# Patient Record
Sex: FEMALE | Race: WHITE | Employment: FULL TIME | URBAN - METROPOLITAN AREA
[De-identification: names, ages, dates, MRNs, and addresses within clinical notes are randomized per-mention and may not be internally consistent; named-entity substitution may affect disease eponyms.]

---

## 2023-02-16 ENCOUNTER — APPOINTMENT (OUTPATIENT)
Dept: PHYSICAL THERAPY | Age: 23
End: 2023-02-16

## 2023-02-16 ENCOUNTER — HOSPITAL ENCOUNTER (OUTPATIENT)
Facility: HOSPITAL | Age: 23
Setting detail: RECURRING SERIES
Discharge: HOME | End: 2023-02-19
Payer: OTHER GOVERNMENT

## 2023-02-16 PROCEDURE — 97535 SELF CARE MNGMENT TRAINING: CPT

## 2023-02-16 PROCEDURE — 97162 PT EVAL MOD COMPLEX 30 MIN: CPT

## 2023-02-16 NOTE — PROGRESS NOTES
PHYSICAL / OCCUPATIONAL THERAPY - DAILY TREATMENT NOTE (updated )    Patient Name: Shasta Prescott    Date: 2023    : 2000  Insurance: Payor: [email or web address] EAST / Plan: [email or web address] EAST  / Product Type: *No Product type* /      Patient  verified Yes     Visit #   Current / Total 1 8   Time   In / Out 8:15 9:20   Pain   In / Out 0 0   Subjective Functional Status/Changes: See POC   Changes to:  Meds, Allergies, Med Hx, Sx Hx? If yes, update Summary List no       TREATMENT AREA =  Stress incontinence (female) (male) (N39.3)  Low back pain  OBJECTIVE    Eval untimed: 40 min    Therapeutic Procedures: Tx Min Billable or 1:1 Min (if diff from Tx Min) Procedure, Rationale, Specifics   25  59110 Self Care/Home Management (timed):  improve patient knowledge and understanding of diagnosis/prognosis, physical therapy expectations, procedures and progression, and pelvic floor muscle anatomy, function and dysfunction  to improve patient's ability to progress to PLOF and address remaining functional goals.   (see flow sheet as applicable)     Details if applicable:              Details if applicable:            Details if applicable:            Details if applicable:            Details if applicable:     22  [facility] Totals Reminder: bill using total billable min of TIMED therapeutic procedures (example: do not include dry needle or estim unattended, both untimed codes, in totals to left)  8-22 min = 1 unit; 23-37 min = 2 units; 38-52 min = 3 units; 53-67 min = 4 units; 68-82 min = 5 units   Total Total     [x]  Patient Education billed concurrently with other procedures   [x] Review HEP    [] Progressed/Changed HEP, detail:    [] Other detail:       Objective Information/Functional Measures/Assessment    See POC    Patient will continue to benefit from skilled PT / OT services to modify and progress therapeutic interventions, analyze and address strength deficits, instruct in home and community integration, and address KI and LBP  to address functional deficits and attain remaining goals.     Progress toward goals / Updated goals:  []  See Progress Note/Recertification    See POC    PLAN  Yes  Continue plan of care  []  Upgrade activities as tolerated  []  Discharge due to :  []  Other:    Hunter Rosas, HEMAL    2/16/2023    9:20 AM    Future Appointments   Date Time Provider Malika Vera   3/9/2023 12:00 PM Hunter Rosas, PT [facility]   3/16/2023  2:00 PM Hunter Rosas, PT [facility]   3/23/2023  2:00 PM Hunter Rosas, PT [facility]   3/27/2023  2:00 PM Hunter Rosas, PT [facility]   4/3/2023  2:00 PM Hunter Rosas, PT [facility]   4/10/2023  2:00 PM Hunter Rosas, PT [facility]   4/17/2023  2:00 PM Hunter Rosas, PT [facility]

## 2023-02-16 NOTE — THERAPY EVALUATION
[facility] PHYSICAL THERAPY  [address] [place] [address], [address] [place] [address], [address] [place] [address] KQ:[phone] Fx: [phone]  Plan of Care / Statement of Necessity for Physical Therapy Services     Patient Name: Suzy Buerger : 2000   Medical   Diagnosis: Low back pain unspecified ()  Unspecified urinary incontinence (R32) Treatment Diagnosis: Stress incontinence [N39.3]  Other low back pain [M54.59]   Onset Date: 2021     Referral Source: Jaylan Massey* Start of Care [facility]): 2023   Prior Hospitalization: See medical history Provider #: [id number]   Prior Level of Function: Symptom free with ADLs   Comorbidities: , Chronic LBP   Medications: Verified on Patient Summary List     Assessment / key information:  Patient  with vaginal delivery complicated by shoulder dystocia 2021 who reports KI with extended standing > 1 hour, sexual activity and working out. She has nocturia 1x nightly. She denies urinary urgency or dyspareunia but does report decreased sexual appreciation. She has chronic LBP with xray and MRI showing DDD of lumbar spine. LBP began following a fall down a ladder while on the job in . 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 Patient presents to PT with impaired strength of pelvic floor muscles scoring 2-/6/3/8 on PERF. On biofeedback there was low net rise of slow twitch contractions at 6.08 micro volts and low net rise of fast twitch contractions at 9.28 micro volts. She scored 60/100 on FOTO Urinary Problem indicating decreased quality of life.     Evaluation Complexity:  History:  MEDIUM  Complexity : 1-2 comorbidities / personal factors will impact the outcome/ POC ; Examination:  HIGH Complexity : 4+ Standardized tests and measures addressing body structure, function, activity limitation and / or participation in recreation  ;Presentation:  MEDIUM Complexity : Evolving with changing characteristics  ; Clinical Decision Making:  MEDIUM Complexity : FOTO score of 26-74 FOTO score = an established functional score where 100 = no disability  Overall Complexity Rating: MEDIUM  Problem List: pain affecting function, decrease strength, decrease ADL/functional abilities, decrease activity tolerance, and other KI    Treatment Plan may include any combination of the followin Therapeutic Exercise, 70913 Neuromuscular Re-Education, 30574 Manual Therapy, 11902 Therapeutic Activity, 63485 Self Care/Home Management, and 97044 Electrical Stim unattended  Patient / Family readiness to learn indicated by: asking questions, trying to perform skills, and interest  Persons(s) to be included in education: patient (P)  Barriers to Learning/Limitations: none  Measures taken if barriers to learning present:   Patient Goal (s): Decrease leakage with sexual activity and working out. Decrease LBP. Patient Self Reported Health Status: good  Rehabilitation Potential: good    Short Term Goals: To be accomplished in 4 weeks    Patient performing pelvic floor HEP 3x day. 2.   Patient will report 25% improvement in urinary leaking with activity. 3.   Patient will increase fast twitch contraction by 5 micro volts from initial evaluation to increase urinary continence. 4.   Patient will score +2 on Global rating of change to indicate LBP to be a little better. Long Term Goals: To be accomplished in 4 weeks  Patient  independent with pelvic floor HEP. 2.   Patient will report 50% improvement in urinary leaking with activity. 3.   Patient will increase fast twitch contraction by 10 microvolts from initial evaluation to increase urinary continence. 4.   Patient will score +4 on Global rating of change to indicate LBP with ADLs to be moderately better.   Frequency / Duration: Patient to be seen 1 times per week for 8 weeks    Patient/ Caregiver education and instruction: Diagnosis, prognosis, self care, activity modification, exercises, and other bladder fitness  [x]  Plan of care has been reviewed with PTA    Certification Period:     Wilmar Cuortney, PT       2/16/2023       12:26 PM  ===================================================================  I certify that the above Therapy Services are being furnished while the patient is under my care. I agree with the treatment plan and certify that this therapy is necessary. [de-identified] Signature:_________________________   DATE:_________   TIME:________                           Ju Feil*    ** Signature, Date and Time must be completed for valid certification **  Please sign and fax to Josh Castillo [phone].   Thank you

## 2023-03-09 ENCOUNTER — HOSPITAL ENCOUNTER (OUTPATIENT)
Facility: HOSPITAL | Age: 23
Setting detail: RECURRING SERIES
Discharge: HOME | End: 2023-03-12
Payer: OTHER GOVERNMENT

## 2023-03-09 PROCEDURE — 97112 NEUROMUSCULAR REEDUCATION: CPT

## 2023-03-09 PROCEDURE — 97140 MANUAL THERAPY 1/> REGIONS: CPT

## 2023-03-09 NOTE — PROGRESS NOTES
PHYSICAL / OCCUPATIONAL THERAPY - DAILY TREATMENT NOTE (updated )    Patient Name: Jennifer Muhammad    Date: 3/9/2023    : 2000  Insurance: Payor: [employer] EAST / Plan: [employer] EAST / Product Type: *No Product type* /      Patient  verified Yes     Visit #   Current / Total 2 8   Time   In / Out 11:50 12:36   Pain   In / Out 0 0   Subjective Functional Status/Changes: Patient reports going to ER for a migraine which had lasted 6 days. No HA now. Doing HEP 3x day prior to week of migraine. Changes to:  Meds, Allergies, Med Hx, Sx Hx? If yes, update Summary List yes       TREATMENT AREA =  Stress incontinence [N39.3]  Other low back pain [M54.59]  OBJECTIVE    Therapeutic Procedures: Tx Min Billable or 1:1 Min (if diff from Tx Min) Procedure, Rationale, Specifics         Details if applicable:       38  93628 Neuromuscular Re-Education (timed):  improve balance, coordination, kinesthetic sense, posture, core stability and proprioception to improve patient's ability to develop conscious control of individual muscles and awareness of position of extremities in order to progress to PLOF and address remaining functional goals. (see flow sheet as applicable)     Details if applicable:  Biofeedback to the pelvic floor muscles via surface electrodes   8  82704 Manual Therapy (timed):  increase strength to improve patient's ability to progress to PLOF and address remaining functional goals. The manual therapy interventions were performed at a separate and distinct time from the therapeutic activities interventions .  (see flow sheet as applicable)     Details if applicable:  Manual palpation of PF contraction with education in proper execution          Details if applicable:            Details if applicable:     55  [facility] Totals Reminder: bill using total billable min of TIMED therapeutic procedures (example: do not include dry needle or estim unattended, both untimed codes, in totals to left)  8- min = 1 unit; 23-37 min = 2 units; 38-52 min = 3 units; 53-67 min = 4 units; 68-82 min = 5 units   Total Total     [x]  Patient Education billed concurrently with other procedures   [x] Review HEP    [x] Progressed/Changed HEP, detail:  Pelvic floor HEP 3x day lying down knees bent with 3 second holds  [] Other detail:       Objective Information/Functional Measures/Assessment    Decreased strength of pelvic floor muscles on biofeedback which may be due to patient not doing HEP this week secondary to migraine. Patient will continue to benefit from skilled PT / OT services to modify and progress therapeutic interventions, analyze and address strength deficits, instruct in home and community integration, and address UI  to address functional deficits and attain remaining goals. Progress toward goals / Updated goals:  []  See Progress Note/Recertification    First follow up since initial evaluation.     PLAN  Yes  Continue plan of care  [x]  Upgrade activities as tolerated  []  Discharge due to :  []  Other:    Catalina Guerreros, PT    3/9/2023    12:36 PM    Future Appointments   Date Time Provider Malika Vera   3/9/2023 12:00 PM Catalina Miners, PT [facility]   3/16/2023  2:00 PM Catalina Miners, PT [facility]   3/23/2023  2:00 PM Catalina Miners, PT [facility]   3/27/2023  2:00 PM Catalina Miners, PT [facility]   4/3/2023  2:00 PM Catalina Miners, PT [facility]   4/10/2023  2:00 PM Catalina Miners, PT [facility]   4/17/2023  2:00 PM Catalina Miners, PT [facility]

## 2023-03-16 ENCOUNTER — HOSPITAL ENCOUNTER (OUTPATIENT)
Facility: HOSPITAL | Age: 23
Setting detail: RECURRING SERIES
Discharge: HOME | End: 2023-03-19
Payer: OTHER GOVERNMENT

## 2023-03-16 PROCEDURE — 97112 NEUROMUSCULAR REEDUCATION: CPT

## 2023-03-16 PROCEDURE — 97535 SELF CARE MNGMENT TRAINING: CPT

## 2023-03-16 NOTE — PROGRESS NOTES
PHYSICAL / OCCUPATIONAL THERAPY - DAILY TREATMENT NOTE (updated )    Patient Name: Agusto Nathan    Date: 3/16/2023    : 2000  Insurance: Payor: [employer] EAST / Plan: [employer] EAST / Product Type: *No Product type* /      Patient  verified Yes     Visit #   Current / Total 3 8   Time   In / Out 2:03 2:53   Pain   In / Out 0 0   Subjective Functional Status/Changes: See PN   Changes to:  Meds, Allergies, Med Hx, Sx Hx? If yes, update Summary List no       TREATMENT AREA =  Stress incontinence (female) (male) [N39.3]  Other low back pain [M54.59]    OBJECTIVE         Therapeutic Procedures: Tx Min Billable or 1:1 Min (if diff from Tx Min) Procedure, Rationale, Specifics   38  [id number] Neuromuscular Re-Education (timed):  improve balance, coordination, kinesthetic sense, posture, core stability and proprioception to improve patient's ability to develop conscious control of individual muscles and awareness of position of extremities in order to progress to PLOF and address remaining functional goals. (see flow sheet as applicable)     Details if applicable:   Pelvic floor biofeedback via surface electrodes. 12  64027 Self Care/Home Management (timed):  improve patient knowledge and understanding of physical therapy expectations, procedures and progression  to improve patient's ability to progress to PLOF and address remaining functional goals. (see flow sheet as applicable)     Details if applicable:  Educated patient in NMES to the pelvic floor indications including patient slow to strengthen the pelvic floor muscles and excessive use of accessory muscles. Patient declines at this time.           Details if applicable:            Details if applicable:            Details if applicable:     48  [address] Reminder: bill using total billable min of TIMED therapeutic procedures (example: do not include dry needle or estim unattended, both untimed codes, in totals to left)  8-22 min = 1 unit;

## 2023-03-16 NOTE — THERAPY RECERTIFICATION
[facility] THERAPY  [address],[facility], [facility] - Ph: [phone]  Fx: [phone]  PHYSICAL THERAPY PROGRESS NOTE  Patient Name: Moriah Mari : 2000   Treatment/Medical Diagnosis: Stress incontinence (female) (male) [N39.3]  Other low back pain [M54.59]   Referral Source: Esperanza Llanes*     Date of Initial Visit: 2023 Attended Visits: 3 Missed Visits: 0     SUMMARY OF TREATMENT  PT has consisted of pelvic floor relaxation/strengthening via biofeedback, education as to pelvic floor anatomy and function and home exercise program.     CURRENT STATUS  Patient has made slow progress in PT with short term goals partially met. Functional progress Includes patient reporting 60% improvement in urinary leaking. She is not leaking with working out and sexual activity. Continued functional impairments include leaking with swimming with butterfly and scissor kick. Also leaks when exercising on the adductor machine in the gym. Patient demonstrates improved but still impaired pelvic floor muscle strength. There is increased use of accessory muscle when joan the pelvic floor muscles. Patient performing pelvic floor exercises 3x day  Status at last Eval: n/a  Current Status: 3x day  Goal Met?  yes    2. Patient will report 25% subjective improvement in urinary leaking with activity. Status at last Eval: n/a  Current Status: 60%  Goal Met?  yes    3. Patient will Increase net rise of fast twitch contraction by 5 microvolts to increase continence. Status at last Eval: 9.28 micro volts  Current Status: 11.25  Goal Met?   progressing    4. Patient will score +2 on GROC indicating LBP a little better  Status at last Eval: n/a  Current Status: 0  Goal Met?  no      New Goals to be achieved in __4__ weeks  1. Patient independent with pelvic floor HEP.   2. Patient will report 75% improvement in urinary leaking with

## 2023-03-23 ENCOUNTER — HOSPITAL ENCOUNTER (OUTPATIENT)
Facility: HOSPITAL | Age: 23
Setting detail: RECURRING SERIES
Discharge: HOME | End: 2023-03-26
Payer: OTHER GOVERNMENT

## 2023-03-23 PROCEDURE — 97112 NEUROMUSCULAR REEDUCATION: CPT

## 2023-03-23 PROCEDURE — 97110 THERAPEUTIC EXERCISES: CPT

## 2023-03-23 PROCEDURE — 97535 SELF CARE MNGMENT TRAINING: CPT

## 2023-03-23 NOTE — PROGRESS NOTES
PHYSICAL / OCCUPATIONAL THERAPY - DAILY TREATMENT NOTE (updated )    Patient Name: Jim Okeefe    Date: 3/23/2023    : 2000  Insurance: Payor:  EAST / Plan: [employer] EAST / Product Type: *No Product type* /      Patient  verified Yes     Visit #   Current / Total 4 8   Time   In / Out 2:06 3:00   Pain   In / Out 2-3 2-3   Subjective Functional Status/Changes: Patient reports that she can feel the pelvic floor contractions better in sitting. HEP 3x day. Changes to:  Meds, Allergies, Med Hx, Sx Hx? If yes, update Summary List no       TREATMENT AREA =  Stress incontinence (female) (male) [N39.3]  Other low back pain [M54.59]    OBJECTIVE         Therapeutic Procedures: Tx Min Billable or 1:1 Min (if diff from Tx Min) Procedure, Rationale, Specifics   15  75052 Self Care/Home Management (timed):  improve patient knowledge and understanding of physical therapy expectations, procedures and progression and Body mechanics for bed mobility and lifting 3year old   to improve patient's ability to progress to PLOF and address remaining functional goals. (see flow sheet as applicable)     Details if applicable:  Education on DDD of spine, SI joint, modifying ADLs considering DDD of spine.  Neuromuscular Re-Education (timed):  improve balance, coordination, kinesthetic sense, posture, core stability and proprioception to improve patient's ability to develop conscious control of individual muscles and awareness of position of extremities in order to progress to PLOF and address remaining functional goals.  (see flow sheet as applicable)     Details if applicable:     10  97987 Neuromuscular Re-Education (timed):  improve balance, coordination, kinesthetic sense, posture, core stability and proprioception to improve patient's ability to develop conscious control of individual muscles and awareness of position of extremities in order to progress to PLOF and address remaining

## 2023-03-27 ENCOUNTER — HOSPITAL ENCOUNTER (OUTPATIENT)
Facility: HOSPITAL | Age: 23
Setting detail: RECURRING SERIES
Discharge: HOME | End: 2023-03-30
Payer: OTHER GOVERNMENT

## 2023-03-27 PROCEDURE — 97110 THERAPEUTIC EXERCISES: CPT

## 2023-03-27 PROCEDURE — 97112 NEUROMUSCULAR REEDUCATION: CPT

## 2023-03-27 PROCEDURE — 97535 SELF CARE MNGMENT TRAINING: CPT

## 2023-03-27 NOTE — PROGRESS NOTES
untimed codes, in totals to left)  8-22 min = 1 unit; 23-37 min = 2 units; 38-52 min = 3 units; 53-67 min = 4 units; 68-82 min = 5 units   Total Total     [x]  Patient Education billed concurrently with other procedures   [x] Review HEP    [x] Progressed/Changed HEP, detail:  Advance pelvic floor HEP to 3x day in seated position with 3 second slow twitch contractions. [x] Other detail:   Add SKTC and TA to core exercies 3x weekly    Objective Information/Functional Measures/Assessment    Patient demonstrates decreased accessory muscle use when performing pelvic floor exercises on biofeedback    Patient will continue to benefit from skilled PT / OT services to modify and progress therapeutic interventions, analyze and address functional mobility deficits, analyze and address ROM deficits, analyze and address strength deficits, analyze and cue for proper movement patterns, analyze and modify for postural abnormalities, instruct in home and community integration, and address UI  to address functional deficits and attain remaining goals. Progress toward goals / Updated goals:  []  See Progress Note/Recertification    Patient progressing towards LTG #1 for HEP performance as HEP has been progressively advanced.  PLAN  Yes  Continue plan of care  [x]  Upgrade activities as tolerated  []  Discharge due to :  []  Other:  Esthela Holland, PT    3/27/2023    3:03 PM    Future Appointments   Date Time Provider Malika Vera   3/27/2023  2:00 PM Esthela Holland, PT [facility]   4/3/2023  2:00 PM Esthela Holland, PT [facility]   4/10/2023  2:00 PM Esthela Carry, PT [facility]   4/17/2023  1:40 PM Esthela Carry, PT [facility]

## 2023-04-03 ENCOUNTER — HOSPITAL ENCOUNTER (OUTPATIENT)
Facility: HOSPITAL | Age: 23
Setting detail: RECURRING SERIES
Discharge: HOME | End: 2023-04-06
Payer: OTHER GOVERNMENT

## 2023-04-03 PROCEDURE — 97112 NEUROMUSCULAR REEDUCATION: CPT

## 2023-04-03 PROCEDURE — 97110 THERAPEUTIC EXERCISES: CPT

## 2023-04-03 NOTE — PROGRESS NOTES
PHYSICAL / OCCUPATIONAL THERAPY - DAILY TREATMENT NOTE (updated )    Patient Name: Ángel Mantilla    Date: 4/3/2023    : 2000  Insurance: Payor:  EAST / Plan: [employer] EAST / Product Type: *No Product type* /      Patient  verified Yes     Visit #   Current / Total 6 8   Time   In / Out 2:01 3:00   Pain   In / Out 4 4   Subjective Functional Status/Changes: Patient reports not doing [address]. She forgot secondary to doing 2 jobs. Changes to:  Meds, Allergies, Med Hx, Sx Hx? If yes, update Summary List no       TREATMENT AREA =  Stress incontinence (female) (male) [N39.3]  Other low back pain [M54.59]    OBJECTIVE    Modalities Rationale:     decrease pain to improve patient's ability to progress to PLOF and address remaining functional goals. min [] Estim Unattended, type/location:                                      []  w/ice    []  w/heat    min [] Estim Attended, type/location:                                     []  w/US     []  w/ice    []  w/heat    []  TENS insruct      min []  Mechanical Traction: type/lbs                   []  pro   []  sup   []  int   []  cont    []  before manual    []  after manual    min []  Ultrasound, settings/location:      min []  Iontophoresis w/ dexamethasone, location:                                               []  take home patch       []  in clinic   10 min  unbill []  Ice     [x]  Heat    location/position: L/S in delcid position    min []  Paraffin,  details:     min []  Vasopneumatic Device, press/temp:     min []  Dorothea Fowler / Tesfaye Aquino: If using vaso (only need to measure limb vaso being performed on)      pre-treatment girth :       post-treatment girth :       measured at (landmark location) :      min []  Other:    Skin assessment post-treatment (if applicable):    [x]  intact    []  redness- no adverse reaction                 []redness - adverse reaction:         Therapeutic Procedures:     Tx Min Billable or 1:1 Min (if diff from Tx

## 2023-04-17 ENCOUNTER — APPOINTMENT (OUTPATIENT)
Facility: HOSPITAL | Age: 23
End: 2023-04-17
Payer: OTHER GOVERNMENT

## 2023-04-24 ENCOUNTER — TELEPHONE (OUTPATIENT)
Facility: HOSPITAL | Age: 23
End: 2023-04-24

## 2023-04-24 NOTE — TELEPHONE ENCOUNTER
Called and LVM to let patient know she is authorized for an additional 9V exp 8/31/23 and to call back if she'd like to schedule f/u.

## 2023-05-01 ENCOUNTER — APPOINTMENT (OUTPATIENT)
Facility: HOSPITAL | Age: 23
End: 2023-05-01
Payer: OTHER GOVERNMENT

## 2023-05-01 ENCOUNTER — TELEPHONE (OUTPATIENT)
Facility: HOSPITAL | Age: 23
End: 2023-05-01

## 2023-05-04 ENCOUNTER — HOSPITAL ENCOUNTER (OUTPATIENT)
Facility: HOSPITAL | Age: 23
Setting detail: RECURRING SERIES
Discharge: HOME | End: 2023-05-07
Payer: OTHER GOVERNMENT

## 2023-05-04 PROCEDURE — 97535 SELF CARE MNGMENT TRAINING: CPT

## 2023-05-04 PROCEDURE — 97110 THERAPEUTIC EXERCISES: CPT

## 2023-05-04 PROCEDURE — 90912 BFB TRAINING 1ST 15 MIN: CPT

## 2023-05-08 ENCOUNTER — HOSPITAL ENCOUNTER (OUTPATIENT)
Facility: HOSPITAL | Age: 23
Setting detail: RECURRING SERIES
Discharge: HOME | End: 2023-05-11
Payer: OTHER GOVERNMENT

## 2023-05-08 PROCEDURE — 97112 NEUROMUSCULAR REEDUCATION: CPT

## 2023-05-08 PROCEDURE — 97110 THERAPEUTIC EXERCISES: CPT

## 2023-05-08 NOTE — PROGRESS NOTES
PHYSICAL / OCCUPATIONAL THERAPY - DAILY TREATMENT NOTE (updated )    Patient Name: Jevon Stewart    Date: 2023    : 2000  Insurance: Payor:  EAST / Plan: [employer] EAST / Product Type: *No Product type* /      Patient  verified Yes     Visit #   Current / Total 9 11   Time   In / Out 9:09 9:55   Pain   In / Out 2 2   Subjective Functional Status/Changes: Went swimming and is still leaking with out kick of butterfly stroke. Changes to:  Meds, Allergies, Med Hx, Sx Hx? If yes, update Summary List yes       TREATMENT AREA =  Stress incontinence (female) (male) [N39.3]  Other low back pain [M54.59]    OBJECTIVE    Therapeutic Procedures: Tx Min Billable or 1:1 Min (if diff from Tx Min) Procedure, Rationale, Specifics   25  [id number] Neuromuscular Re-Education (timed):  improve balance, coordination, kinesthetic sense, posture, core stability and proprioception to improve patient's ability to develop conscious control of individual muscles and awareness of position of extremities in order to progress to PLOF and address remaining functional goals. (see flow sheet as applicable)     Details if applicable:  Pelvic floor biofeedback via surface electrodes. 21  71905 Therapeutic Exercise (timed):  increase ROM, strength, coordination, balance, and proprioception to improve patient's ability to progress to PLOF and address remaining functional goals.  (see flow sheet as applicable)     Details if applicable:            Details if applicable:            Details if applicable:            Details if applicable:     55  [facility] Totals Reminder: bill using total billable min of TIMED therapeutic procedures (example: do not include dry needle or estim unattended, both untimed codes, in totals to left)  8-22 min = 1 unit; 23-37 min = 2 units; 38-52 min = 3 units; 53-67 min = 4 units; 68-82 min = 5 units   Total Total     [x]  Patient Education billed concurrently with other procedures   [x] Review

## 2023-05-17 ENCOUNTER — HOSPITAL ENCOUNTER (OUTPATIENT)
Facility: HOSPITAL | Age: 23
Setting detail: RECURRING SERIES
Discharge: HOME | End: 2023-05-20
Payer: OTHER GOVERNMENT

## 2023-05-17 PROCEDURE — 90912 BFB TRAINING 1ST 15 MIN: CPT

## 2023-05-17 PROCEDURE — 97110 THERAPEUTIC EXERCISES: CPT

## 2023-05-17 NOTE — PROGRESS NOTES
PHYSICAL / OCCUPATIONAL THERAPY - DAILY TREATMENT NOTE (updated )    Patient Name: Cruz Raza    Date: 2023    : 2000  Insurance: Payor:  EAST / Plan: [employer] EAST / Product Type: *No Product type* /      Patient  verified Yes     Visit #   Current / Total 10 11   Time   In / Out 11:53 12:25   Pain   In / Out 0 0   Subjective Functional Status/Changes: Patient reports no leaking this week but hasn't been swimming. Changes to:  Meds, Allergies, Med Hx, Sx Hx? If yes, update Summary List no       TREATMENT AREA =  Stress incontinence (female) (male) [N39.3]  Other low back pain [M54.59]    OBJECTIVE         Therapeutic Procedures: Tx Min Billable or 1:1 Min (if diff from Tx Min) Procedure, Rationale, Specifics   15  00758 Biofeedback Training, initial 15 (timed): Improve pelvic floor muscle contraction/relaxation in order to progress PLOF and address remaining functional goals. Details if applicable:              Details if applicable:       51865 Therapeutic Exercise (timed):  increase ROM, strength, coordination, balance, and proprioception to improve patient's ability to progress to PLOF and address remaining functional goals.  (see flow sheet as applicable)     Details if applicable:            Details if applicable:            Details if applicable:     28  [facility] Totals Reminder: bill using total billable min of TIMED therapeutic procedures (example: do not include dry needle or estim unattended, both untimed codes, in totals to left)  8-22 min = 1 unit; 23-37 min = 2 units; 38-52 min = 3 units; 53-67 min = 4 units; 68-82 min = 5 units   Total Total     [x]  Patient Education billed concurrently with other procedures   [x] Review HEP    [x] Progressed/Changed HEP, detail: Prone PF exercises with heel squeeze   [x] Other detail:   Add standing core exercises and quad UE/LE raises    Objective Information/Functional Measures/Assessment    Continues to have impaired

## 2023-05-25 ENCOUNTER — HOSPITAL ENCOUNTER (OUTPATIENT)
Facility: HOSPITAL | Age: 23
Setting detail: RECURRING SERIES
Discharge: HOME | End: 2023-05-28
Payer: OTHER GOVERNMENT

## 2023-05-25 PROCEDURE — 97535 SELF CARE MNGMENT TRAINING: CPT

## 2023-05-25 PROCEDURE — 90912 BFB TRAINING 1ST 15 MIN: CPT

## 2024-02-16 ENCOUNTER — OFFICE VISIT (OUTPATIENT)
Facility: CLINIC | Age: 24
End: 2024-02-16

## 2024-02-16 VITALS
RESPIRATION RATE: 16 BRPM | BODY MASS INDEX: 32.66 KG/M2 | HEIGHT: 61 IN | SYSTOLIC BLOOD PRESSURE: 132 MMHG | OXYGEN SATURATION: 99 % | HEART RATE: 77 BPM | WEIGHT: 173 LBS | TEMPERATURE: 98.9 F | DIASTOLIC BLOOD PRESSURE: 87 MMHG

## 2024-02-16 DIAGNOSIS — Z13.29 SCREENING FOR ENDOCRINE DISORDER: ICD-10-CM

## 2024-02-16 DIAGNOSIS — Z11.59 ENCOUNTER FOR HEPATITIS C SCREENING TEST FOR LOW RISK PATIENT: ICD-10-CM

## 2024-02-16 DIAGNOSIS — Z13.6 SCREENING FOR CARDIOVASCULAR CONDITION: ICD-10-CM

## 2024-02-16 DIAGNOSIS — Z87.42 HISTORY OF ABNORMAL CERVICAL PAP SMEAR: ICD-10-CM

## 2024-02-16 DIAGNOSIS — Z30.431 FAMILY PLANNING, IUD (INTRAUTERINE DEVICE) CHECK/REINSERTION/REMOVAL: ICD-10-CM

## 2024-02-16 DIAGNOSIS — Z00.00 ENCOUNTER FOR MEDICAL EXAMINATION TO ESTABLISH CARE: ICD-10-CM

## 2024-02-16 DIAGNOSIS — R51.9 CHRONIC INTRACTABLE HEADACHE, UNSPECIFIED HEADACHE TYPE: ICD-10-CM

## 2024-02-16 DIAGNOSIS — Z11.59 NEED FOR HEPATITIS B SCREENING TEST: ICD-10-CM

## 2024-02-16 DIAGNOSIS — G89.29 CHRONIC INTRACTABLE HEADACHE, UNSPECIFIED HEADACHE TYPE: ICD-10-CM

## 2024-02-16 DIAGNOSIS — H65.91 OTITIS MEDIA WITH EFFUSION, RIGHT: Primary | ICD-10-CM

## 2024-02-16 RX ORDER — INDOMETHACIN 25 MG/1
25 CAPSULE ORAL 2 TIMES DAILY PRN
Qty: 30 CAPSULE | Refills: 2 | Status: SHIPPED | OUTPATIENT
Start: 2024-02-16

## 2024-02-16 RX ORDER — FLUTICASONE PROPIONATE 50 MCG
2 SPRAY, SUSPENSION (ML) NASAL DAILY
Qty: 1 EACH | Refills: 0 | Status: SHIPPED | OUTPATIENT
Start: 2024-02-16 | End: 2024-03-01

## 2024-02-16 RX ORDER — INDOMETHACIN 25 MG/1
25 CAPSULE ORAL 2 TIMES DAILY WITH MEALS
COMMUNITY
End: 2024-02-16 | Stop reason: SDUPTHER
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
## 2024-03-07 ENCOUNTER — TELEPHONE (OUTPATIENT)
Facility: CLINIC | Age: 24
End: 2024-03-07

## 2024-03-07 NOTE — TELEPHONE ENCOUNTER
PATIENT REQUEST NEW Referral.      CURRENT REFERRAL FOR [facility]       DOESN'T ACCEPT HER INSURANCE

## 2024-03-18 ENCOUNTER — HOSPITAL ENCOUNTER (OUTPATIENT)
Facility: HOSPITAL | Age: 24
Setting detail: SPECIMEN
Discharge: HOME | End: 2024-03-21

## 2024-03-18 DIAGNOSIS — Z11.59 ENCOUNTER FOR HEPATITIS C SCREENING TEST FOR LOW RISK PATIENT: ICD-10-CM

## 2024-03-18 DIAGNOSIS — G89.29 CHRONIC INTRACTABLE HEADACHE, UNSPECIFIED HEADACHE TYPE: ICD-10-CM

## 2024-03-18 DIAGNOSIS — R51.9 CHRONIC INTRACTABLE HEADACHE, UNSPECIFIED HEADACHE TYPE: ICD-10-CM

## 2024-03-18 DIAGNOSIS — Z13.29 SCREENING FOR ENDOCRINE DISORDER: ICD-10-CM

## 2024-03-18 DIAGNOSIS — Z13.6 SCREENING FOR CARDIOVASCULAR CONDITION: ICD-10-CM

## 2024-03-18 DIAGNOSIS — Z11.59 NEED FOR HEPATITIS B SCREENING TEST: ICD-10-CM

## 2024-03-18 LAB
ALBUMIN SERPL-MCNC: 4.4 G/DL (ref 3.4–5)
ALBUMIN/GLOB SERPL: 1.3 (ref 0.8–1.7)
ALP SERPL-CCNC: 107 U/L (ref 45–117)
ALT SERPL-CCNC: 41 U/L (ref 13–56)
ANION GAP SERPL CALC-SCNC: 6 MMOL/L (ref 3–18)
AST SERPL-CCNC: 24 U/L (ref 10–38)
BASOPHILS # BLD: 0 K/UL (ref 0–0.1)
BASOPHILS NFR BLD: 1 % (ref 0–2)
BILIRUB SERPL-MCNC: 0.6 MG/DL (ref 0.2–1)
BUN SERPL-MCNC: 10 MG/DL (ref 7–18)
BUN/CREAT SERPL: 12 (ref 12–20)
CALCIUM SERPL-MCNC: 9.6 MG/DL (ref 8.5–10.1)
CHLORIDE SERPL-SCNC: 103 MMOL/L (ref 100–111)
CHOLEST SERPL-MCNC: 151 MG/DL
CO2 SERPL-SCNC: 29 MMOL/L (ref 21–32)
CREAT SERPL-MCNC: 0.86 MG/DL (ref 0.6–1.3)
DIFFERENTIAL METHOD BLD: ABNORMAL
EOSINOPHIL # BLD: 0.2 K/UL (ref 0–0.4)
EOSINOPHIL NFR BLD: 3 % (ref 0–5)
ERYTHROCYTE [DISTWIDTH] IN BLOOD BY AUTOMATED COUNT: 11.8 % (ref 11.6–14.5)
EST. AVERAGE GLUCOSE BLD GHB EST-MCNC: 103 MG/DL
GLOBULIN SER CALC-MCNC: 3.4 G/DL (ref 2–4)
GLUCOSE SERPL-MCNC: 92 MG/DL (ref 74–99)
HBA1C MFR BLD: 5.2 % (ref 4.2–5.6)
HCT VFR BLD AUTO: 46.3 % (ref 35–45)
HDLC SERPL-MCNC: 35 MG/DL (ref 40–60)
HDLC SERPL: 4.3 (ref 0–5)
HGB BLD-MCNC: 15.1 G/DL (ref 12–16)
IMM GRANULOCYTES # BLD AUTO: 0.1 K/UL (ref 0–0.04)
IMM GRANULOCYTES NFR BLD AUTO: 1 % (ref 0–0.5)
LDLC SERPL CALC-MCNC: 96 MG/DL (ref 0–100)
LIPID PANEL: ABNORMAL
LYMPHOCYTES # BLD: 2.2 K/UL (ref 0.9–3.6)
LYMPHOCYTES NFR BLD: 28 % (ref 21–52)
MCH RBC QN AUTO: 29.8 PG (ref 24–34)
MCHC RBC AUTO-ENTMCNC: 32.6 G/DL (ref 31–37)
MCV RBC AUTO: 91.5 FL (ref 78–100)
MONOCYTES # BLD: 0.5 K/UL (ref 0.05–1.2)
MONOCYTES NFR BLD: 7 % (ref 3–10)
NEUTS SEG # BLD: 4.8 K/UL (ref 1.8–8)
NEUTS SEG NFR BLD: 61 % (ref 40–73)
NRBC # BLD: 0 K/UL (ref 0–0.01)
NRBC BLD-RTO: 0 PER 100 WBC
PLATELET # BLD AUTO: 309 K/UL (ref 135–420)
PMV BLD AUTO: 10.4 FL (ref 9.2–11.8)
POTASSIUM SERPL-SCNC: 4.5 MMOL/L (ref 3.5–5.5)
PROT SERPL-MCNC: 7.8 G/DL (ref 6.4–8.2)
RBC # BLD AUTO: 5.06 M/UL (ref 4.2–5.3)
SODIUM SERPL-SCNC: 138 MMOL/L (ref 136–145)
TRIGL SERPL-MCNC: 100 MG/DL
TSH SERPL DL<=0.05 MIU/L-ACNC: 2.5 UIU/ML (ref 0.36–3.74)
VLDLC SERPL CALC-MCNC: 20 MG/DL
WBC # BLD AUTO: 7.8 K/UL (ref 4.6–13.2)

## 2024-03-18 PROCEDURE — 87340 HEPATITIS B SURFACE AG IA: CPT

## 2024-03-18 PROCEDURE — 83036 HEMOGLOBIN GLYCOSYLATED A1C: CPT

## 2024-03-18 PROCEDURE — 86706 HEP B SURFACE ANTIBODY: CPT

## 2024-03-18 PROCEDURE — 85025 COMPLETE CBC W/AUTO DIFF WBC: CPT

## 2024-03-18 PROCEDURE — 80053 COMPREHEN METABOLIC PANEL: CPT

## 2024-03-18 PROCEDURE — 36415 COLL VENOUS BLD VENIPUNCTURE: CPT

## 2024-03-18 PROCEDURE — 84443 ASSAY THYROID STIM HORMONE: CPT

## 2024-03-18 PROCEDURE — 86803 HEPATITIS C AB TEST: CPT

## 2024-03-18 PROCEDURE — 80061 LIPID PANEL: CPT

## 2024-03-19 LAB
HBV SURFACE AB SER QL IA: POSITIVE
HBV SURFACE AB SERPL IA-ACNC: 55.9 MIU/ML
HBV SURFACE AG SER QL: <0.1 INDEX
HBV SURFACE AG SER QL: NEGATIVE
HCV AB SER IA-ACNC: 0.1 INDEX
HCV AB SERPL QL IA: NEGATIVE
HEP BS AB COMMENT: NORMAL
HEPATITIS C COMMENT: NORMAL

## 2024-03-27 ENCOUNTER — OFFICE VISIT (OUTPATIENT)
Facility: CLINIC | Age: 24
End: 2024-03-27
Payer: OTHER GOVERNMENT

## 2024-03-27 VITALS
BODY MASS INDEX: 31.91 KG/M2 | HEART RATE: 59 BPM | SYSTOLIC BLOOD PRESSURE: 112 MMHG | TEMPERATURE: 98 F | WEIGHT: 169 LBS | DIASTOLIC BLOOD PRESSURE: 65 MMHG | HEIGHT: 61 IN | RESPIRATION RATE: 16 BRPM | OXYGEN SATURATION: 100 %

## 2024-03-27 DIAGNOSIS — R51.9 CHRONIC INTRACTABLE HEADACHE, UNSPECIFIED HEADACHE TYPE: Primary | ICD-10-CM

## 2024-03-27 DIAGNOSIS — F32.A ANXIETY AND DEPRESSION: ICD-10-CM

## 2024-03-27 DIAGNOSIS — J02.0 STREP THROAT: ICD-10-CM

## 2024-03-27 DIAGNOSIS — F41.9 ANXIETY AND DEPRESSION: ICD-10-CM

## 2024-03-27 DIAGNOSIS — G89.29 CHRONIC INTRACTABLE HEADACHE, UNSPECIFIED HEADACHE TYPE: Primary | ICD-10-CM

## 2024-03-27 PROCEDURE — 99214 OFFICE O/P EST MOD 30 MIN: CPT | Performed by: STUDENT IN AN ORGANIZED HEALTH CARE EDUCATION/TRAINING PROGRAM

## 2024-03-27 SDOH — ECONOMIC STABILITY: INCOME INSECURITY: HOW HARD IS IT FOR YOU TO PAY FOR THE VERY BASICS LIKE FOOD, HOUSING, MEDICAL CARE, AND HEATING?: NOT HARD AT ALL

## 2024-03-27 SDOH — ECONOMIC STABILITY: FOOD INSECURITY: WITHIN THE PAST 12 MONTHS, THE FOOD YOU BOUGHT JUST DIDN'T LAST AND YOU DIDN'T HAVE MONEY TO GET MORE.: NEVER TRUE

## 2024-03-27 SDOH — ECONOMIC STABILITY: FOOD INSECURITY: WITHIN THE PAST 12 MONTHS, YOU WORRIED THAT YOUR FOOD WOULD RUN OUT BEFORE YOU GOT MONEY TO BUY MORE.: NEVER TRUE

## 2024-03-27 SDOH — ECONOMIC STABILITY: HOUSING INSECURITY
IN THE LAST 12 MONTHS, WAS THERE A TIME WHEN YOU DID NOT HAVE A STEADY PLACE TO SLEEP OR SLEPT IN A SHELTER (INCLUDING NOW)?: NO

## 2024-03-27 ASSESSMENT — ENCOUNTER SYMPTOMS
WHEEZING: 0
ABDOMINAL PAIN: 0
NAUSEA: 0
SHORTNESS OF BREATH: 0
VOMITING: 0
BACK PAIN: 0
CHEST TIGHTNESS: 0
DIARRHEA: 0
RHINORRHEA: 0
COUGH: 0
BLOOD IN STOOL: 0

## 2024-03-27 ASSESSMENT — PATIENT HEALTH QUESTIONNAIRE - PHQ9
SUM OF ALL RESPONSES TO PHQ QUESTIONS 1-9: 0
SUM OF ALL RESPONSES TO PHQ9 QUESTIONS 1 & 2: 0
2. FEELING DOWN, DEPRESSED OR HOPELESS: NOT AT ALL
1. LITTLE INTEREST OR PLEASURE IN DOING THINGS: NOT AT ALL
SUM OF ALL RESPONSES TO PHQ QUESTIONS 1-9: 0

## 2024-03-27 ASSESSMENT — ANXIETY QUESTIONNAIRES
3. WORRYING TOO MUCH ABOUT DIFFERENT THINGS: NOT AT ALL
1. FEELING NERVOUS, ANXIOUS, OR ON EDGE: NOT AT ALL
4. TROUBLE RELAXING: NOT AT ALL
IF YOU CHECKED OFF ANY PROBLEMS ON THIS QUESTIONNAIRE, HOW DIFFICULT HAVE THESE PROBLEMS MADE IT FOR YOU TO DO YOUR WORK, TAKE CARE OF THINGS AT HOME, OR GET ALONG WITH OTHER PEOPLE: NOT DIFFICULT AT ALL
2. NOT BEING ABLE TO STOP OR CONTROL WORRYING: NOT AT ALL
5. BEING SO RESTLESS THAT IT IS HARD TO SIT STILL: NOT AT ALL
7. FEELING AFRAID AS IF SOMETHING AWFUL MIGHT HAPPEN: NOT AT ALL
6. BECOMING EASILY ANNOYED OR IRRITABLE: NOT AT ALL
GAD7 TOTAL SCORE: 0

## 2024-03-27 NOTE — PROGRESS NOTES
\"Have you been to the ER, urgent care clinic since your last visit?  Hospitalized since your last visit?\"    YES - When: approximately 3 days ago.  Where and Why: [facility] .    “Have you seen or consulted any other health care providers outside of [facility] since your last visit?”    NO            Click Here for Release of Records Request   
03/18/2024 0.86  0.6 - 1.3 MG/DL Final    Bun/Cre Ratio 03/18/2024 12  12 - 20   Final    Est Glom Filt Rate 03/18/2024 >60  >60 ml/min/1.73m2 Final    Comment:    Pediatric calculator link: https://www.kidney.org/professionals/kdoqi/gfr_calculatorped     These results are not intended for use in patients <18 years of age.     eGFR results are calculated without a race factor using  the 2021 CKD-EPI equation. Careful clinical correlation is recommended, particularly when comparing to results calculated using previous equations.  The CKD-EPI equation is less accurate in patients with extremes of muscle mass, extra-renal metabolism of creatinine, excessive creatine ingestion, or following therapy that affects renal tubular secretion.      Calcium 03/18/2024 9.6  8.5 - 10.1 MG/DL Final    Total Bilirubin 03/18/2024 0.6  0.2 - 1.0 MG/DL Final    ALT 03/18/2024 41  13 - 56 U/L Final    AST 03/18/2024 24  10 - 38 U/L Final    Alk Phosphatase 03/18/2024 107  45 - 117 U/L Final    Total Protein 03/18/2024 7.8  6.4 - 8.2 g/dL Final    Albumin 03/18/2024 4.4  3.4 - 5.0 g/dL Final    Globulin 03/18/2024 3.4  2.0 - 4.0 g/dL Final    Albumin/Globulin Ratio 03/18/2024 1.3  0.8 - 1.7   Final    LIPID PANEL 03/18/2024      Final    Cholesterol, Total 03/18/2024 151  <200 MG/DL Final    Triglycerides 03/18/2024 100  <150 MG/DL Final    Comment: The drugs N-acetylcysteine (NAC) and  Metamiszole have been found to cause falsely  low results in this chemical assay. Please  be sure to submit blood samples obtained  BEFORE administration of either of these  drugs to assure correct results.      HDL 03/18/2024 35 (L)  40 - 60 MG/DL Final    LDL Calculated 03/18/2024 96  0 - 100 MG/DL Final    VLDL Cholesterol Calculated 03/18/2024 20  MG/DL Final    Chol/HDL Ratio 03/18/2024 4.3  0 - 5.0   Final    Hemoglobin A1C 03/18/2024 5.2  4.2 - 5.6 % Final    Comment: (NOTE)  HbA1C Interpretive Ranges  <5.7              Normal  5.7 - 6.4

## 2024-09-26 ENCOUNTER — OFFICE VISIT (OUTPATIENT)
Facility: CLINIC | Age: 24
End: 2024-09-26
Payer: OTHER GOVERNMENT

## 2024-09-26 VITALS
BODY MASS INDEX: 31.38 KG/M2 | DIASTOLIC BLOOD PRESSURE: 64 MMHG | OXYGEN SATURATION: 99 % | HEIGHT: 61 IN | SYSTOLIC BLOOD PRESSURE: 114 MMHG | WEIGHT: 166.2 LBS | HEART RATE: 77 BPM

## 2024-09-26 DIAGNOSIS — G47.33 OSA (OBSTRUCTIVE SLEEP APNEA): ICD-10-CM

## 2024-09-26 DIAGNOSIS — F32.A ANXIETY AND DEPRESSION: ICD-10-CM

## 2024-09-26 DIAGNOSIS — G89.29 CHRONIC INTRACTABLE HEADACHE, UNSPECIFIED HEADACHE TYPE: Primary | ICD-10-CM

## 2024-09-26 DIAGNOSIS — F41.9 ANXIETY AND DEPRESSION: ICD-10-CM

## 2024-09-26 DIAGNOSIS — R51.9 CHRONIC INTRACTABLE HEADACHE, UNSPECIFIED HEADACHE TYPE: Primary | ICD-10-CM

## 2024-09-26 PROCEDURE — 99214 OFFICE O/P EST MOD 30 MIN: CPT | Performed by: STUDENT IN AN ORGANIZED HEALTH CARE EDUCATION/TRAINING PROGRAM

## 2024-09-26 SDOH — ECONOMIC STABILITY: INCOME INSECURITY: HOW HARD IS IT FOR YOU TO PAY FOR THE VERY BASICS LIKE FOOD, HOUSING, MEDICAL CARE, AND HEATING?: NOT VERY HARD

## 2024-09-26 SDOH — ECONOMIC STABILITY: FOOD INSECURITY: WITHIN THE PAST 12 MONTHS, THE FOOD YOU BOUGHT JUST DIDN'T LAST AND YOU DIDN'T HAVE MONEY TO GET MORE.: NEVER TRUE

## 2024-09-26 ASSESSMENT — ENCOUNTER SYMPTOMS
SHORTNESS OF BREATH: 0
COUGH: 0
BLOOD IN STOOL: 0
BACK PAIN: 0
CHEST TIGHTNESS: 0
DIARRHEA: 0
ABDOMINAL PAIN: 0
WHEEZING: 0
VOMITING: 0
RHINORRHEA: 0
NAUSEA: 0

## 2024-09-26 ASSESSMENT — PATIENT HEALTH QUESTIONNAIRE - PHQ9
SUM OF ALL RESPONSES TO PHQ QUESTIONS 1-9: 0
SUM OF ALL RESPONSES TO PHQ9 QUESTIONS 1 & 2: 0
1. LITTLE INTEREST OR PLEASURE IN DOING THINGS: NOT AT ALL
SUM OF ALL RESPONSES TO PHQ QUESTIONS 1-9: 0
SUM OF ALL RESPONSES TO PHQ QUESTIONS 1-9: 0
2. FEELING DOWN, DEPRESSED OR HOPELESS: NOT AT ALL
SUM OF ALL RESPONSES TO PHQ QUESTIONS 1-9: 0

## 2025-07-30 ENCOUNTER — OFFICE VISIT (OUTPATIENT)
Facility: CLINIC | Age: 25
End: 2025-07-30
Payer: OTHER GOVERNMENT

## 2025-07-30 VITALS
HEIGHT: 61 IN | BODY MASS INDEX: 31.53 KG/M2 | SYSTOLIC BLOOD PRESSURE: 108 MMHG | DIASTOLIC BLOOD PRESSURE: 64 MMHG | OXYGEN SATURATION: 99 % | WEIGHT: 167 LBS | HEART RATE: 65 BPM

## 2025-07-30 DIAGNOSIS — F32.A ANXIETY AND DEPRESSION: ICD-10-CM

## 2025-07-30 DIAGNOSIS — R51.9 CHRONIC INTRACTABLE HEADACHE, UNSPECIFIED HEADACHE TYPE: ICD-10-CM

## 2025-07-30 DIAGNOSIS — E55.9 VITAMIN D DEFICIENCY: ICD-10-CM

## 2025-07-30 DIAGNOSIS — G89.29 CHRONIC INTRACTABLE HEADACHE, UNSPECIFIED HEADACHE TYPE: ICD-10-CM

## 2025-07-30 DIAGNOSIS — Z86.32 HISTORY OF GESTATIONAL DIABETES: ICD-10-CM

## 2025-07-30 DIAGNOSIS — F41.9 ANXIETY AND DEPRESSION: ICD-10-CM

## 2025-07-30 DIAGNOSIS — Z13.29 SCREENING FOR ENDOCRINE DISORDER: ICD-10-CM

## 2025-07-30 DIAGNOSIS — Z13.6 SCREENING FOR CARDIOVASCULAR CONDITION: ICD-10-CM

## 2025-07-30 DIAGNOSIS — D50.9 IRON DEFICIENCY ANEMIA, UNSPECIFIED IRON DEFICIENCY ANEMIA TYPE: Primary | ICD-10-CM

## 2025-07-30 PROCEDURE — G2211 COMPLEX E/M VISIT ADD ON: HCPCS | Performed by: STUDENT IN AN ORGANIZED HEALTH CARE EDUCATION/TRAINING PROGRAM

## 2025-07-30 PROCEDURE — 99214 OFFICE O/P EST MOD 30 MIN: CPT | Performed by: STUDENT IN AN ORGANIZED HEALTH CARE EDUCATION/TRAINING PROGRAM

## 2025-07-30 SDOH — ECONOMIC STABILITY: FOOD INSECURITY: WITHIN THE PAST 12 MONTHS, THE FOOD YOU BOUGHT JUST DIDN'T LAST AND YOU DIDN'T HAVE MONEY TO GET MORE.: NEVER TRUE

## 2025-07-30 SDOH — ECONOMIC STABILITY: FOOD INSECURITY: WITHIN THE PAST 12 MONTHS, YOU WORRIED THAT YOUR FOOD WOULD RUN OUT BEFORE YOU GOT MONEY TO BUY MORE.: NEVER TRUE

## 2025-07-30 ASSESSMENT — PATIENT HEALTH QUESTIONNAIRE - PHQ9
SUM OF ALL RESPONSES TO PHQ QUESTIONS 1-9: 0
SUM OF ALL RESPONSES TO PHQ QUESTIONS 1-9: 0
2. FEELING DOWN, DEPRESSED OR HOPELESS: NOT AT ALL
SUM OF ALL RESPONSES TO PHQ QUESTIONS 1-9: 0
1. LITTLE INTEREST OR PLEASURE IN DOING THINGS: NOT AT ALL
SUM OF ALL RESPONSES TO PHQ QUESTIONS 1-9: 0

## 2025-07-30 ASSESSMENT — ENCOUNTER SYMPTOMS
CHEST TIGHTNESS: 0
COUGH: 0
DIARRHEA: 0
SHORTNESS OF BREATH: 0
BACK PAIN: 0
NAUSEA: 0
WHEEZING: 0
RHINORRHEA: 0
ABDOMINAL PAIN: 0
BLOOD IN STOOL: 0
VOMITING: 0

## 2025-07-30 NOTE — PROGRESS NOTES
Flori Monsivais is a 24 y.o. female presenting today for Follow-up  .     Chief Complaint   Patient presents with    Follow-up       HPI:  Flori Monsivais presents to the office today for follow up.    Patient has a past medical history of stress incontinence, BJ on CPAP, anxiety/depression, headache.    Patient delivered a baby via  in 2025.  She had gestational DM during the pregnancy.  Hemoglobin at discharge on 2025 was 8.6.  Patient was diagnosed with GDM at approximately 26 weeks pregnancy.  States that she was initially prescribed metformin but that did not help so she was started on insulin Lantus 28 units nightly.  She stopped taking the insulin after delivery.    Currently she is taking iron supplements and multivitamins.    She reports some soreness at the site of the  scar.    Patient reports a history of abnormal Pap smears-she is following with gynecology.    Headaches: Patient reports a history of migraines.    In the past she has taken rizatriptan and sumatriptan with no relief.    Recently they have been less severe.  She has not needed to take medication.    Anxiety/depression: Currently not on any medications.  Stable.          Review of Systems   Constitutional:  Negative for activity change, appetite change, chills, diaphoresis, fatigue, fever and unexpected weight change.   HENT:  Negative for congestion, nosebleeds, postnasal drip and rhinorrhea.    Respiratory:  Negative for cough, chest tightness, shortness of breath and wheezing.    Cardiovascular:  Negative for chest pain and leg swelling.   Gastrointestinal:  Negative for abdominal pain, blood in stool, diarrhea, nausea and vomiting.   Endocrine: Negative for polydipsia and polyphagia.   Genitourinary:  Positive for pelvic pain. Negative for decreased urine volume, dysuria and frequency.   Musculoskeletal:  Negative for back pain, myalgias and neck pain.   Neurological:  Negative for dizziness, tremors, seizures,

## 2025-08-01 ENCOUNTER — TELEPHONE (OUTPATIENT)
Facility: CLINIC | Age: 25
End: 2025-08-01

## 2025-08-05 ENCOUNTER — HOSPITAL ENCOUNTER (OUTPATIENT)
Facility: HOSPITAL | Age: 25
Setting detail: SPECIMEN
Discharge: HOME | End: 2025-08-08
Payer: OTHER GOVERNMENT

## 2025-08-05 DIAGNOSIS — Z13.29 SCREENING FOR ENDOCRINE DISORDER: ICD-10-CM

## 2025-08-05 DIAGNOSIS — E55.9 VITAMIN D DEFICIENCY: ICD-10-CM

## 2025-08-05 DIAGNOSIS — D50.9 IRON DEFICIENCY ANEMIA, UNSPECIFIED IRON DEFICIENCY ANEMIA TYPE: ICD-10-CM

## 2025-08-05 DIAGNOSIS — Z86.32 HISTORY OF GESTATIONAL DIABETES: ICD-10-CM

## 2025-08-05 DIAGNOSIS — Z13.6 SCREENING FOR CARDIOVASCULAR CONDITION: ICD-10-CM

## 2025-08-05 LAB
25(OH)D3 SERPL-MCNC: 31.5 NG/ML (ref 30–100)
CHOLEST SERPL-MCNC: 138 MG/DL
ERYTHROCYTE [DISTWIDTH] IN BLOOD BY AUTOMATED COUNT: 14.7 % (ref 11.6–14.5)
EST. AVERAGE GLUCOSE BLD GHB EST-MCNC: 102 MG/DL
FERRITIN SERPL-MCNC: 24 NG/ML (ref 13–400)
FOLATE SERPL-MCNC: 15.6 NG/ML (ref 4.6–34.8)
HBA1C MFR BLD: 5.2 % (ref 4.2–5.6)
HCT VFR BLD AUTO: 41.5 % (ref 35–45)
HDLC SERPL-MCNC: 45 MG/DL (ref 40–60)
HDLC SERPL: 3.1 (ref 0–5)
HGB BLD-MCNC: 12.8 G/DL (ref 12–16)
IRON SATN MFR SERPL: 14 %
IRON SERPL-MCNC: 62 UG/DL (ref 50–175)
LDLC SERPL CALC-MCNC: 73 MG/DL (ref 0–100)
MCH RBC QN AUTO: 26.7 PG (ref 24–34)
MCHC RBC AUTO-ENTMCNC: 30.8 G/DL (ref 31–37)
MCV RBC AUTO: 86.6 FL (ref 78–100)
NRBC # BLD: 0 K/UL (ref 0–0.01)
NRBC BLD-RTO: 0 PER 100 WBC
PLATELET # BLD AUTO: 283 K/UL (ref 135–420)
PMV BLD AUTO: 10.7 FL (ref 9.2–11.8)
RBC # BLD AUTO: 4.79 M/UL (ref 4.2–5.3)
TIBC SERPL-MCNC: 437 UG/DL (ref 250–450)
TRIGL SERPL-MCNC: 98 MG/DL (ref 0–150)
TSH, 3RD GENERATION: 4.41 UIU/ML (ref 0.27–4.2)
UIBC SERPL-MCNC: 375 UG/DL (ref 112–347)
VIT B12 SERPL-MCNC: 426 PG/ML (ref 211–911)
VLDLC SERPL CALC-MCNC: 20 MG/DL
WBC # BLD AUTO: 4.5 K/UL (ref 4.6–13.2)

## 2025-08-05 PROCEDURE — 82306 VITAMIN D 25 HYDROXY: CPT

## 2025-08-05 PROCEDURE — 85027 COMPLETE CBC AUTOMATED: CPT

## 2025-08-05 PROCEDURE — 82728 ASSAY OF FERRITIN: CPT

## 2025-08-05 PROCEDURE — 83540 ASSAY OF IRON: CPT

## 2025-08-05 PROCEDURE — 36415 COLL VENOUS BLD VENIPUNCTURE: CPT

## 2025-08-05 PROCEDURE — 83550 IRON BINDING TEST: CPT

## 2025-08-05 PROCEDURE — 84443 ASSAY THYROID STIM HORMONE: CPT

## 2025-08-05 PROCEDURE — 83036 HEMOGLOBIN GLYCOSYLATED A1C: CPT

## 2025-08-05 PROCEDURE — 80061 LIPID PANEL: CPT

## 2025-08-05 PROCEDURE — 82607 VITAMIN B-12: CPT

## 2025-08-05 PROCEDURE — 82746 ASSAY OF FOLIC ACID SERUM: CPT
